# Patient Record
Sex: MALE | Race: WHITE | NOT HISPANIC OR LATINO | Employment: UNEMPLOYED | URBAN - NONMETROPOLITAN AREA
[De-identification: names, ages, dates, MRNs, and addresses within clinical notes are randomized per-mention and may not be internally consistent; named-entity substitution may affect disease eponyms.]

---

## 2019-09-16 ENCOUNTER — TELEPHONE (OUTPATIENT)
Dept: CARDIOLOGY | Facility: CLINIC | Age: 64
End: 2019-09-16

## 2019-09-16 NOTE — TELEPHONE ENCOUNTER
Patient confirmed appt with Dr Huang ([facility] referral) for Thursday the 19th . Will obtain records.

## 2019-09-19 ENCOUNTER — PREP FOR SURGERY (OUTPATIENT)
Dept: OTHER | Facility: HOSPITAL | Age: 64
End: 2019-09-19

## 2019-09-19 ENCOUNTER — OFFICE VISIT (OUTPATIENT)
Dept: CARDIOLOGY | Facility: CLINIC | Age: 64
End: 2019-09-19

## 2019-09-19 VITALS
HEIGHT: 67 IN | HEART RATE: 74 BPM | BODY MASS INDEX: 22.73 KG/M2 | SYSTOLIC BLOOD PRESSURE: 120 MMHG | WEIGHT: 144.8 LBS | DIASTOLIC BLOOD PRESSURE: 74 MMHG | OXYGEN SATURATION: 98 %

## 2019-09-19 DIAGNOSIS — E78.2 MIXED HYPERLIPIDEMIA: ICD-10-CM

## 2019-09-19 DIAGNOSIS — R07.2 PRECORDIAL PAIN: ICD-10-CM

## 2019-09-19 DIAGNOSIS — I10 ESSENTIAL HYPERTENSION: ICD-10-CM

## 2019-09-19 DIAGNOSIS — I25.5 ISCHEMIC CARDIOMYOPATHY: Primary | ICD-10-CM

## 2019-09-19 DIAGNOSIS — Z00.00 GENERAL MEDICAL EXAM: Primary | ICD-10-CM

## 2019-09-19 DIAGNOSIS — I25.5 ISCHEMIC CARDIOMYOPATHY: ICD-10-CM

## 2019-09-19 DIAGNOSIS — R94.39 ABNORMAL STRESS TEST: ICD-10-CM

## 2019-09-19 DIAGNOSIS — Z72.0 TOBACCO ABUSE: ICD-10-CM

## 2019-09-19 PROCEDURE — 99204 OFFICE O/P NEW MOD 45 MIN: CPT | Performed by: INTERNAL MEDICINE

## 2019-09-19 PROCEDURE — 93000 ELECTROCARDIOGRAM COMPLETE: CPT | Performed by: INTERNAL MEDICINE

## 2019-09-19 RX ORDER — SODIUM CHLORIDE 9 MG/ML
125 INJECTION, SOLUTION INTRAVENOUS CONTINUOUS
Status: CANCELLED | OUTPATIENT
Start: 2019-09-26

## 2019-09-19 RX ORDER — MONTELUKAST SODIUM 10 MG/1
10 TABLET ORAL DAILY
Refills: 1 | COMMUNITY
Start: 2019-08-21

## 2019-09-19 RX ORDER — BUDESONIDE AND FORMOTEROL FUMARATE DIHYDRATE 160; 4.5 UG/1; UG/1
AEROSOL RESPIRATORY (INHALATION)
COMMUNITY
End: 2022-09-01

## 2019-09-19 RX ORDER — LISINOPRIL 10 MG/1
10 TABLET ORAL DAILY
Refills: 1 | COMMUNITY
Start: 2019-08-21 | End: 2021-05-05

## 2019-09-19 RX ORDER — SODIUM CHLORIDE 0.9 % (FLUSH) 0.9 %
10 SYRINGE (ML) INJECTION AS NEEDED
Status: CANCELLED | OUTPATIENT
Start: 2019-09-26

## 2019-09-19 RX ORDER — ATORVASTATIN CALCIUM 40 MG/1
40 TABLET, FILM COATED ORAL DAILY
Refills: 5 | COMMUNITY
Start: 2019-08-12 | End: 2022-02-25

## 2019-09-19 RX ORDER — HYDROGEN PEROXIDE 2.65 ML/100ML
81 LIQUID ORAL; TOPICAL DAILY
Refills: 3 | COMMUNITY
Start: 2019-08-12

## 2019-09-19 RX ORDER — SODIUM CHLORIDE 0.9 % (FLUSH) 0.9 %
3 SYRINGE (ML) INJECTION EVERY 12 HOURS SCHEDULED
Status: CANCELLED | OUTPATIENT
Start: 2019-09-26

## 2019-09-19 NOTE — PROGRESS NOTES
Kwame Hendricks  64 y.o. male    09/19/2019  1. General medical exam    2. Precordial pain    3. Abnormal stress test    4. Essential hypertension    5. Ischemic cardiomyopathy    6. Mixed hyperlipidemia    7. Tobacco abuse        History of Present Illness  Mr. Hendricks is a 64-year-old male who was referred by  for consideration for cardiac catheterization.  He was apparently admitted to [facility] in August 2019 with epigastric and chest discomfort which radiated to the back.   Cardiac enzymes apparently were negative for myocardial injury and further work-up included a gallbladder ultrasound which showed small gallstones and adenomyomatosis.  Of a 4 mm echogenic structure in the right collecting system which could represent a nonobstructing renal stone/cortical calcification,. X-ray of the chest was unremarkable.  Low-dose lung CT showed chronic changes with no suspicious parenchymal nodules  EKG showed sinus rhythm with no ST-T wave changes diagnostic of ischemia.  There was poor R wave progression in the anteroseptal leads.  Echocardiogram performed showed ejection fraction of 60% and there was mild mitral and tricuspid regurgitation  An exercise Cardiolite stress test was performed and he walked for a workload of 6M ETS and achieved a heart rate of 140.  There was no chest pain or ventricular ectopics.  However the nuclear images showed an apical scar with no definite ongoing ischemia but the gated images showed an LV ejection fraction of 25% which was different from what was noted on the echocardiogram.  She was hence recommended cardiac catheterization.  Patient has multiple risk factors for coronary artery disease including long-standing tobacco abuse, hypertension, hyperlipidemia and positive family history for CAD.  He was not a very good historian.    EKG showed sinus rhythm with heart rate of 69 bpm, no ST-T wave changes diagnostic of ischemia.  Early  "repolarization.    SUBJECTIVE    Allergies   Allergen Reactions   • Propranolol Shortness Of Breath         Past Medical History:   Diagnosis Date   • COPD (chronic obstructive pulmonary disease) (CMS/Formerly [facility])    • Hyperlipidemia    • Hypertension          History reviewed. No pertinent surgical history.      History reviewed. No pertinent family history.      Social History     Socioeconomic History   • Marital status:      Spouse name: Not on file   • Number of children: Not on file   • Years of education: Not on file   • Highest education level: Not on file   Tobacco Use   • Smoking status: Current Every Day Smoker     Packs/day: 1.00   • Smokeless tobacco: Never Used   Substance and Sexual Activity   • Alcohol use: No     Frequency: Never   • Drug use: Defer   • Sexual activity: Defer         Current Outpatient Medications   Medication Sig Dispense Refill   • atorvastatin (LIPITOR) 40 MG tablet Take 40 mg by mouth Daily.  5   • budesonide-formoterol (SYMBICORT) 160-4.5 MCG/ACT inhaler Symbicort 160 mcg-4.5 mcg/actuation HFA aerosol inhaler   1-2 puffs, q 12 h for longtern control of COPD/ASTHMA. [not a rescue medication]     • EQ ASPIRIN ADULT LOW DOSE 81 MG EC tablet Take 81 mg by mouth Daily.  3   • Fluticasone Furoate-Vilanterol (BREO ELLIPTA) 100-25 MCG/INH inhaler Breo Ellipta 100 mcg-25 mcg/dose powder for inhalation   Inhale 1 puff every day by inhalation route.     • lisinopril (PRINIVIL,ZESTRIL) 10 MG tablet Take 10 mg by mouth Daily. FOR 30 DAYS  1   • montelukast (SINGULAIR) 10 MG tablet Take 10 mg by mouth Daily.  1     No current facility-administered medications for this visit.          OBJECTIVE    /74   Pulse 74   Ht 170.2 cm (67\")   Wt 65.7 kg (144 lb 12.8 oz)   SpO2 98%   BMI 22.68 kg/m²         Review of Systems     Constitutional:  Denies recent weight loss, weight gain, fever or chills     HENT:  Denies any hearing loss, epistaxis, hoarseness, or difficulty speaking.     Eyes: " Wears eyeglasses or contact lenses     Respiratory: Dyspnea with exertion,no cough, wheezing, or hemoptysis.     Cardiovascular: See HPI     Gastrointestinal:  Denies change in bowel habits, dyspepsia, ulcer disease, hematochezia, or melena.     Endocrine: Negative for cold intolerance, heat intolerance, polydipsia, polyphagia and polyuria.    Genitourinary: Negative.      Musculoskeletal: Denies any history of arthritic symptoms or back problems     Skin:  Denies any change in hair or nails, rashes, or skin lesions.     Allergic/Immunologic: Negative.  Negative for environmental allergies, food allergies and immunocompromised state.     Neurological:  Denies any history of recurrent headaches, strokes, TIA, or seizure disorder.     Hematological: Denies any food allergies, seasonal allergies, bleeding disorders, or lymphadenopathy.     Psychiatric/Behavioral: Denies any history of depression, substance abuse, or change in cognitive function.         Physical Exam     Constitutional: Cooperative, alert and oriented, in no acute distress.     HENT:   Head: Normocephalic, normal hair patterns, no masses or tenderness.  Ears, Nose, and Throat: No gross abnormalities. No pallor or cyanosis.   Eyes: EOMS intact, PERRL, conjunctivae and lids unremarkable. Fundoscopic exam and visual fields not performed.   Neck: No palpable masses or adenopathy, no thyromegaly, no JVD, carotid pulses are full and equal bilaterally and without  Bruits.     Cardiovascular: Regular rhythm, S1 and S2 normal, no S3 or S4.  No murmurs, gallops, or rubs detected.     Pulmonary/Chest: Chest: normal symmetry, normal respiratory excursion, no intercostal retraction, no use of accessory muscles.            Pulmonary: Normal breath sounds. No rales or ronchi.    Abdominal: Abdomen soft, bowel sounds normoactive, no masses, no hepatosplenomegaly, non-tender, no bruits.     Musculoskeletal: No deformities, clubbing, cyanosis, erythema, or edema  observed.     Neurological: No gross motor or sensory deficits noted, affect appropriate, oriented to time, person, place.     Skin: Warm and dry to the touch, no apparent skin lesions or masses noted.     Psychiatric: He has a normal mood and affect. His behavior is normal. Judgment and thought content normal.         Procedures      No results found for: WBC, HGB, HCT, MCV, PLT  No results found for: GLUCOSE, BUN, CREATININE, EGFRIFNONA, EGFRIFAFRI, BCR, CO2, CALCIUM, PROTENTOTREF, ALBUMIN, LABIL2, AST, ALT  No results found for: CHOL  No results found for: TRIG  No results found for: HDL  No components found for: LDLCALC  No results found for: LDL  No results found for: HDLLDLRATIO  No components found for: CHOLHDL  No results found for: HGBA1C  No results found for: TSH, W9ABLYP, W5BDULR, THYROIDAB        ASSESSMENT AND PLAN  Mr. Hendricks has multiple risk factors for coronary artery disease as described above and an abnormal stress test showing apical scar and LV dilatation with an ejection fraction of 25%.  I believe that definitive evaluation by cardiac catheterization would be appropriate.  However there is a disparity between the ejection fraction noted by echocardiogram.  The plan would be to schedule the procedure next week.  All the risks and benefits have been explained to him in detail and he will be ASA II and Mallampati II.  Aggressive risk factor modification and smoking cessation was stressed.  I have continued antihypertensive therapy with lisinopril, antiplatelet therapy with aspirin and lipid-lowering therapy with atorvastatin.  Further recommendations will follow.    Kwame was seen today for follow-up.    Diagnoses and all orders for this visit:    General medical exam  -     ECG 12 Lead    Precordial pain    Abnormal stress test    Essential hypertension    Ischemic cardiomyopathy    Mixed hyperlipidemia    Tobacco abuse        Patient's Body mass index is 22.68 kg/m². BMI is within normal  parameters. No follow-up required..      Kwame Hendricks is a current cigarettes user.  He currently smokes 2 pack of cigarettes per day for a duration of 45 years. I have educated him on the risk of diseases from using tobacco products such as cancer, COPD and heart diease.     I spent 3  minutes counseling the patient.          Alva Canas MD  9/19/2019  2:36 PM

## 2019-09-26 ENCOUNTER — HOSPITAL ENCOUNTER (OUTPATIENT)
Facility: HOSPITAL | Age: 64
Setting detail: HOSPITAL OUTPATIENT SURGERY
Discharge: HOME | End: 2019-09-26
Attending: INTERNAL MEDICINE | Admitting: INTERNAL MEDICINE

## 2019-09-26 VITALS
WEIGHT: 143.3 LBS | BODY MASS INDEX: 22.49 KG/M2 | RESPIRATION RATE: 18 BRPM | HEIGHT: 67 IN | DIASTOLIC BLOOD PRESSURE: 90 MMHG | OXYGEN SATURATION: 96 % | HEART RATE: 66 BPM | TEMPERATURE: 97 F | SYSTOLIC BLOOD PRESSURE: 187 MMHG

## 2019-09-26 DIAGNOSIS — I25.5 ISCHEMIC CARDIOMYOPATHY: ICD-10-CM

## 2019-09-26 LAB
ALBUMIN SERPL-MCNC: 4.2 G/DL (ref 3.5–5.2)
ALBUMIN/GLOB SERPL: 1.6 G/DL
ALP SERPL-CCNC: 98 U/L (ref 39–117)
ALT SERPL W P-5'-P-CCNC: 13 U/L (ref 1–41)
ANION GAP SERPL CALCULATED.3IONS-SCNC: 10 MMOL/L (ref 5–15)
AST SERPL-CCNC: 19 U/L (ref 1–40)
BILIRUB SERPL-MCNC: 0.2 MG/DL (ref 0.2–1.2)
BUN BLD-MCNC: 13 MG/DL (ref 8–23)
BUN/CREAT SERPL: 11.4 (ref 7–25)
CALCIUM SPEC-SCNC: 8.9 MG/DL (ref 8.6–10.5)
CHLORIDE SERPL-SCNC: 109 MMOL/L (ref 98–107)
CO2 SERPL-SCNC: 26 MMOL/L (ref 22–29)
CREAT BLD-MCNC: 1.14 MG/DL (ref 0.76–1.27)
DEPRECATED RDW RBC AUTO: 47.3 FL (ref 37–54)
ERYTHROCYTE [DISTWIDTH] IN BLOOD BY AUTOMATED COUNT: 13.8 % (ref 12.3–15.4)
GFR SERPL CREATININE-BSD FRML MDRD: 65 ML/MIN/1.73
GLOBULIN UR ELPH-MCNC: 2.7 GM/DL
GLUCOSE BLD-MCNC: 106 MG/DL (ref 65–99)
HCT VFR BLD AUTO: 40.3 % (ref 37.5–51)
HGB BLD-MCNC: 13.3 G/DL (ref 13–17.7)
INR PPP: 0.94 (ref 0.8–1.2)
MCH RBC QN AUTO: 30.6 PG (ref 26.6–33)
MCHC RBC AUTO-ENTMCNC: 33 G/DL (ref 31.5–35.7)
MCV RBC AUTO: 92.6 FL (ref 79–97)
PLATELET # BLD AUTO: 195 10*3/MM3 (ref 140–450)
PMV BLD AUTO: 10.1 FL (ref 6–12)
POTASSIUM BLD-SCNC: 4.1 MMOL/L (ref 3.5–5.2)
PROT SERPL-MCNC: 6.9 G/DL (ref 6–8.5)
PROTHROMBIN TIME: 12.4 SECONDS (ref 11.1–15.3)
RBC # BLD AUTO: 4.35 10*6/MM3 (ref 4.14–5.8)
SODIUM BLD-SCNC: 145 MMOL/L (ref 136–145)
WBC NRBC COR # BLD: 6.87 10*3/MM3 (ref 3.4–10.8)

## 2019-09-26 PROCEDURE — 25010000002 MIDAZOLAM PER 1 MG: Performed by: INTERNAL MEDICINE

## 2019-09-26 PROCEDURE — C1769 GUIDE WIRE: HCPCS | Performed by: INTERNAL MEDICINE

## 2019-09-26 PROCEDURE — C1760 CLOSURE DEV, VASC: HCPCS | Performed by: INTERNAL MEDICINE

## 2019-09-26 PROCEDURE — 0 IOPAMIDOL PER 1 ML: Performed by: INTERNAL MEDICINE

## 2019-09-26 PROCEDURE — 93458 L HRT ARTERY/VENTRICLE ANGIO: CPT | Performed by: INTERNAL MEDICINE

## 2019-09-26 PROCEDURE — C1894 INTRO/SHEATH, NON-LASER: HCPCS | Performed by: INTERNAL MEDICINE

## 2019-09-26 PROCEDURE — 25010000002 FENTANYL CITRATE (PF) 100 MCG/2ML SOLUTION: Performed by: INTERNAL MEDICINE

## 2019-09-26 PROCEDURE — 85610 PROTHROMBIN TIME: CPT | Performed by: INTERNAL MEDICINE

## 2019-09-26 PROCEDURE — 85027 COMPLETE CBC AUTOMATED: CPT | Performed by: INTERNAL MEDICINE

## 2019-09-26 PROCEDURE — 80053 COMPREHEN METABOLIC PANEL: CPT | Performed by: INTERNAL MEDICINE

## 2019-09-26 RX ORDER — ACETAMINOPHEN 325 MG/1
650 TABLET ORAL EVERY 4 HOURS PRN
Status: DISCONTINUED | OUTPATIENT
Start: 2019-09-26 | End: 2019-09-26 | Stop reason: HOSPADM

## 2019-09-26 RX ORDER — SODIUM CHLORIDE 9 MG/ML
125 INJECTION, SOLUTION INTRAVENOUS CONTINUOUS
Status: ACTIVE | OUTPATIENT
Start: 2019-09-26 | End: 2019-09-26

## 2019-09-26 RX ORDER — SODIUM CHLORIDE 9 MG/ML
125 INJECTION, SOLUTION INTRAVENOUS CONTINUOUS
Status: DISCONTINUED | OUTPATIENT
Start: 2019-09-26 | End: 2019-09-26 | Stop reason: SDUPTHER

## 2019-09-26 RX ORDER — MIDAZOLAM HYDROCHLORIDE 1 MG/ML
INJECTION INTRAMUSCULAR; INTRAVENOUS AS NEEDED
Status: DISCONTINUED | OUTPATIENT
Start: 2019-09-26 | End: 2019-09-26 | Stop reason: HOSPADM

## 2019-09-26 RX ORDER — SODIUM CHLORIDE 0.9 % (FLUSH) 0.9 %
10 SYRINGE (ML) INJECTION AS NEEDED
Status: DISCONTINUED | OUTPATIENT
Start: 2019-09-26 | End: 2019-09-26 | Stop reason: HOSPADM

## 2019-09-26 RX ORDER — FENTANYL CITRATE 50 UG/ML
INJECTION, SOLUTION INTRAMUSCULAR; INTRAVENOUS AS NEEDED
Status: DISCONTINUED | OUTPATIENT
Start: 2019-09-26 | End: 2019-09-26 | Stop reason: HOSPADM

## 2019-09-26 RX ORDER — ISOSORBIDE MONONITRATE 30 MG/1
30 TABLET, EXTENDED RELEASE ORAL EVERY MORNING
Qty: 30 TABLET | Refills: 6 | Status: SHIPPED | OUTPATIENT
Start: 2019-09-26 | End: 2021-05-05

## 2019-09-26 RX ORDER — SODIUM CHLORIDE 0.9 % (FLUSH) 0.9 %
3 SYRINGE (ML) INJECTION EVERY 12 HOURS SCHEDULED
Status: DISCONTINUED | OUTPATIENT
Start: 2019-09-26 | End: 2019-09-26 | Stop reason: HOSPADM

## 2019-09-26 RX ORDER — NITROGLYCERIN 0.4 MG/1
TABLET SUBLINGUAL AS NEEDED
Status: DISCONTINUED | OUTPATIENT
Start: 2019-09-26 | End: 2019-09-26 | Stop reason: HOSPADM

## 2019-09-26 RX ORDER — LIDOCAINE HYDROCHLORIDE 20 MG/ML
INJECTION, SOLUTION INFILTRATION; PERINEURAL AS NEEDED
Status: DISCONTINUED | OUTPATIENT
Start: 2019-09-26 | End: 2019-09-26 | Stop reason: HOSPADM

## 2019-09-26 RX ADMIN — SODIUM CHLORIDE 125 ML/HR: 9 INJECTION, SOLUTION INTRAVENOUS at 06:55

## 2021-05-05 ENCOUNTER — OFFICE VISIT (OUTPATIENT)
Dept: CARDIOLOGY | Facility: CLINIC | Age: 66
End: 2021-05-05

## 2021-05-05 VITALS
HEIGHT: 67 IN | SYSTOLIC BLOOD PRESSURE: 154 MMHG | BODY MASS INDEX: 22.79 KG/M2 | OXYGEN SATURATION: 98 % | HEART RATE: 73 BPM | DIASTOLIC BLOOD PRESSURE: 88 MMHG | WEIGHT: 145.2 LBS

## 2021-05-05 DIAGNOSIS — I25.5 ISCHEMIC CARDIOMYOPATHY: ICD-10-CM

## 2021-05-05 DIAGNOSIS — I10 ESSENTIAL HYPERTENSION: Primary | ICD-10-CM

## 2021-05-05 DIAGNOSIS — R07.2 PRECORDIAL PAIN: ICD-10-CM

## 2021-05-05 DIAGNOSIS — E78.2 MIXED HYPERLIPIDEMIA: ICD-10-CM

## 2021-05-05 DIAGNOSIS — Z72.0 TOBACCO ABUSE: ICD-10-CM

## 2021-05-05 DIAGNOSIS — R06.09 DYSPNEA ON EXERTION: ICD-10-CM

## 2021-05-05 LAB
QT INTERVAL: 382 MS
QTC INTERVAL: 420 MS

## 2021-05-05 PROCEDURE — 93000 ELECTROCARDIOGRAM COMPLETE: CPT | Performed by: INTERNAL MEDICINE

## 2021-05-05 PROCEDURE — 99215 OFFICE O/P EST HI 40 MIN: CPT | Performed by: INTERNAL MEDICINE

## 2021-05-05 RX ORDER — AMLODIPINE BESYLATE 10 MG/1
10 TABLET ORAL DAILY
COMMUNITY
Start: 2021-04-18 | End: 2022-02-25

## 2021-05-05 RX ORDER — CHLORTHALIDONE 25 MG/1
25 TABLET ORAL DAILY
COMMUNITY
Start: 2021-04-30 | End: 2022-02-25

## 2021-05-05 RX ORDER — ISOSORBIDE MONONITRATE 60 MG/1
60 TABLET, EXTENDED RELEASE ORAL EVERY MORNING
Qty: 90 TABLET | Refills: 3 | Status: SHIPPED | OUTPATIENT
Start: 2021-05-05

## 2021-05-05 RX ORDER — LISINOPRIL 40 MG/1
40 TABLET ORAL DAILY
COMMUNITY
Start: 2021-04-12

## 2021-05-05 NOTE — PROGRESS NOTES
Kwame Hendricks  66 y.o. male    1. Essential hypertension    2. Mixed hyperlipidemia    3. Ischemic cardiomyopathy    4. Precordial pain    5. Dyspnea on exertion    6. Tobacco abuse        History of Present Illness;  Mr. Kwame Hendricks is a 66-year-old male who is being seen by me after very long interval.  The patient was a very poor historian and most of the information was obtained from his wife and medical records from [facility].  It appears that he was evaluated there on 4/12/2021 and he did complain of occasional chest discomfort especially after he uses inhalers.  However his symptoms are unrelated to exertion or ambulation.  He does have longstanding NYHA class II dyspnea on exertion and has COPD.     I had seen him back in September 2019 when he was referred by .  Back then, he complained of chest pressure/epigastric discomfort with risk factors for coronary artery disease including tobacco abuse, hypertention, hyperlipidemia and positive family history for CAD. He had been diagnosed to have gallstones. Though echocardiogram performed showed an ejection fraction of 60% with no significant valve abnormalities, Exercise Cardiolite stress test showed an apical scar with no definite reversible ischemia but ejection fraction was reported at 25%.  Hence definitive evaluation by cardiac catheterization was recommended.       Cardiac catheterization at that time showed:  Left main coronary artery: This was a patent vessel with minor plaque and some degree of calcification and left main divided into a left anterior descending coronary artery and left circumflex coronary artery  Left anterior descending coronary artery: This was a large system with minor calcification and minor plaques in the proximal segment.  Mid LAD had minor luminal irregularities and distal LAD was a patent vessel which wrapped around the apex of the heart.  Diagonal 1 had a high origin and noncritical disease up  to 30 to 40% was noted.  This was a small caliber vessel.  Diagonal 2 was a small caliber vessel less than 1.8 mm in diameter with a focal area of eccentric 80 to 90% stenosis in the proximal/mid segment  Left Circumflex coronary artery: This was a medium sized vessel with luminal irregularities and eccentric 70 to 75% lesion in the proximal/mid segment of the vessel.  Distally there were 2 small caliber obtuse marginal branches which were patent  Right Coronary artery: This was a dominant vessel with an eccentric 40% lesion in the proximal segment.  There was a high bifurcation of the vessel into a PDA and PLV branch.  No flow-limiting lesions noted.     Left heart catheterization and left ventricular gram: LV systolic function was normal with an ejection fraction of 55 to 60% with no wall motion of normalities.  Aortic pressure was 150/90 and left ventricular pressure was 150/15 mmHg     Impression: Normal left ventricular systolic function with no wall motion abnormality's.  Estimated ejection fraction 55 to 60%.  Systemic hypertension  70 to 75% lesion noted in the proximal/mid left circumflex coronary artery which is a medium sized vessel.  Small caliber (<1.8 mm ) diagonal 2 vessel had an eccentric 80 to 90% stenosis  Noncritical disease noted in the left anterior descending coronary artery and right coronary artery.     Maximal medical management was recommended and the patient did undergo cholecystectomy without any cardiac issues.  The patient unfortunately has continued to smoke but has been very compliant with his medications.  It appears that his diuretics were adjusted by his nephrologist and antihypertensive medications were adjusted.  He is known to have CKD stage IIIa.  His lab work from 2/18/2021 were reviewed and electrolytes were in the normal range and BUN was 19 and creatinine was 1.33.  AST and ALT were within normal limits.  CBC showed a hemoglobin of 14.7 and hematocrit of 44.1    On review  of his records and noted that he has had a cerebrovascular event in February 2020.  In March 2021 he was seen at [facility] for chest pressure and lab work showed that cardiac enzymes were negative for myocardial injury.  Hemoglobin was 14.5 and hematocrit 42.4.  BUN was 39 and creatinine 1.45    EKG showed sinus rhythm with heart rate of 73 bpm.  Rightward axis.  No ST-T wave changes diagnostic of ischemia.    SUBJECTIVE    Allergies   Allergen Reactions   • Propranolol Shortness Of Breath         Past Medical History:   Diagnosis Date   • Arthritis    • Asthma    • COPD (chronic obstructive pulmonary disease) (CMS/HCC)    • Hyperlipidemia    • Hypertension          Past Surgical History:   Procedure Laterality Date   • CARDIAC CATHETERIZATION N/A 9/26/2019    Procedure: Left Heart Cath/PCI if indicated;  Surgeon: Alva Canas MD;  Location: [facility] INVASIVE LOCATION;  Service: Cardiovascular   • KNEE ARTHROSCOPY Right    • TONSILLECTOMY           History reviewed. No pertinent family history.      Social History     Socioeconomic History   • Marital status:      Spouse name: Not on file   • Number of children: Not on file   • Years of education: Not on file   • Highest education level: Not on file   Tobacco Use   • Smoking status: Current Every Day Smoker     Packs/day: 2.00   • Smokeless tobacco: Never Used   Substance and Sexual Activity   • Alcohol use: No   • Drug use: No   • Sexual activity: Defer         Current Outpatient Medications   Medication Sig Dispense Refill   • amLODIPine (NORVASC) 10 MG tablet Take 10 mg by mouth Daily. FOR 30 DAYS     • budesonide-formoterol (SYMBICORT) 160-4.5 MCG/ACT inhaler Symbicort 160 mcg-4.5 mcg/actuation HFA aerosol inhaler   1-2 puffs, q 12 h for longtern control of COPD/ASTHMA.  not a rescue medication      • chlorthalidone (HYGROTON) 25 MG tablet Take 25 mg by mouth Daily.     • EQ ASPIRIN ADULT LOW DOSE 81 MG EC tablet Take  "81 mg by mouth Daily.  3   • Fluticasone Furoate-Vilanterol (BREO ELLIPTA) 100-25 MCG/INH inhaler Breo Ellipta 100 mcg-25 mcg/dose powder for inhalation   Inhale 1 puff every day by inhalation route.     • Incruse Ellipta 62.5 MCG/INH aerosol powder  Inhale 1 puff Daily.     • lisinopril (PRINIVIL,ZESTRIL) 40 MG tablet Take 40 mg by mouth Daily. FOR 30 DAYS     • montelukast (SINGULAIR) 10 MG tablet Take 10 mg by mouth Daily.  1   • atorvastatin (LIPITOR) 40 MG tablet Take 40 mg by mouth Daily.  5   • isosorbide mononitrate (IMDUR) 60 MG 24 hr tablet Take 1 tablet by mouth Every Morning. 90 tablet 3     No current facility-administered medications for this visit.         OBJECTIVE    /88 (BP Location: Left arm, Patient Position: Sitting, Cuff Size: Adult)   Pulse 73   Ht 170.2 cm (67\")   Wt 65.9 kg (145 lb 3.2 oz)   SpO2 98%   BMI 22.74 kg/m²         Review of Systems : The following systems are reviewed and changes noted as indicated in the history of present illness.    Constitutional:  Denies recent weight loss, weight gain, fever or chills     HENT:  Denies any hearing loss, epistaxis, hoarseness, or difficulty speaking.     Eyes: Wears eyeglasses or contact lenses     Respiratory: Dyspnea with exertion,no cough, wheezing, or hemoptysis.     Cardiovascular: See HPI    Gastrointestinal:  Denies change in bowel habits, dyspepsia, ulcer disease, hematochezia, or melena.     Endocrine: Negative for cold intolerance, heat intolerance, polydipsia, polyphagia and polyuria.    Genitourinary: CKD      Musculoskeletal: Denies any history of arthritic symptoms or back problems     Neurological:  Denies any history of recurrent headaches, strokes, TIA, or seizure disorder.     Hematological: Denies any food allergies, seasonal allergies, bleeding disorders, or lymphadenopathy.     Psychiatric/Behavioral: Denies any history of depression, substance abuse, or change in cognitive function.         Physical Exam : " The following systems are reviewed and changes noted as indicated below    Constitutional: Cooperative, alert and oriented, in no acute distress.     HENT:   Head: Normocephalic, normal hair patterns, no masses or tenderness.  Ears, Nose, and Throat: No gross abnormalities. No pallor or cyanosis.   Eyes: EOMS intact, PERRL, conjunctivae and lids unremarkable. Fundoscopic exam and visual fields not performed.   Neck: No palpable masses or adenopathy, no thyromegaly, no JVD, carotid pulses are full and equal bilaterally and without  Bruits.     Cardiovascular: Regular rhythm, S1 and S2 normal, no S3 or S4.  No murmurs, gallops, or rubs detected.     Pulmonary/Chest: Chest: Increased AP diameter of the chest, no intercostal retraction, no use of accessory muscles.            Pulmonary: Normal breath sounds. No rales or ronchi.    Abdominal: Abdomen soft, bowel sounds normoactive, no masses, no hepatosplenomegaly, non-tender, no bruits.     Musculoskeletal: No deformities, clubbing, cyanosis, erythema, or edema observed.     Neurological: No gross motor or sensory deficits noted, affect appropriate, oriented to time, person, place.     Skin: Warm and dry to the touch, no apparent skin lesions or masses noted.     Psychiatric: He has a normal mood and affect. His behavior is normal. Judgment and thought content normal.         Procedures      Lab Results   Component Value Date    WBC 6.87 09/26/2019    HGB 13.3 09/26/2019    HCT 40.3 09/26/2019    MCV 92.6 09/26/2019     09/26/2019     Lab Results   Component Value Date    GLUCOSE 106 (H) 09/26/2019    BUN 13 09/26/2019    CREATININE 1.14 09/26/2019    EGFRIFNONA 65 09/26/2019    BCR 11.4 09/26/2019    CO2 26.0 09/26/2019    CALCIUM 8.9 09/26/2019    ALBUMIN 4.20 09/26/2019    AST 19 09/26/2019    ALT 13 09/26/2019     No results found for: CHOL  No results found for: TRIG  No results found for: HDL  No components found for: LDLCALC  No results found for: LDL  No  results found for: HDLLDLRATIO  No components found for: CHOLHDL  No results found for: HGBA1C  No results found for: TSH, L1HCVPM, H8ZIVOD, THYROIDAB        ASSESSMENT AND PLAN  Mr. Hendricks has multiple medical issues as discussed in detail in the history of present illness including COPD/emphysema, hyperlipidemia, hypertension, DJD, history of cerebrovascular event, tobacco abuse, moderate coronary artery disease which is medically manageable back in September 2021 and CKD stage III.    As mentioned above, the patient is a very poor historian and his symptoms appears to be related to using inhalers rather than exertion.  His EKG shows no acute ST-T wave changes.  No signs of congestive heart failure was noted.  After reviewing the situation I discussed different treatment options with him in detail and I believe that maximization of medical therapy at this time would be reasonable given his history of CKD.  I have increased the dose of isosorbide mononitrate to 60 mg daily and optimization of blood pressure was stressed.  Amlodipine, chlorthalidone, lisinopril have been continued.  Aggressive risk factor modification and Bacot cessation stressed.    An echocardiogram to assess left ventricular and valvular function has been arranged.  If symptoms persist in spite of maximal medical management, and invasive work-up could be considered.    About 45 minutes was spent in the assessment and evaluation of this patient.    Diagnoses and all orders for this visit:    1. Essential hypertension (Primary)  -     ECG 12 Lead  -     Adult Transthoracic Echo Complete w/ Color, Spectral and Contrast if Necessary Per Protocol; Future    2. Mixed hyperlipidemia  -     Adult Transthoracic Echo Complete w/ Color, Spectral and Contrast if Necessary Per Protocol; Future    3. Ischemic cardiomyopathy  -     Adult Transthoracic Echo Complete w/ Color, Spectral and Contrast if Necessary Per Protocol; Future    4. Precordial pain  -      Adult Transthoracic Echo Complete w/ Color, Spectral and Contrast if Necessary Per Protocol; Future    5. Dyspnea on exertion  -     Adult Transthoracic Echo Complete w/ Color, Spectral and Contrast if Necessary Per Protocol; Future    6. Tobacco abuse    Other orders  -     isosorbide mononitrate (IMDUR) 60 MG 24 hr tablet; Take 1 tablet by mouth Every Morning.  Dispense: 90 tablet; Refill: 3        Patient's Body mass index is 22.74 kg/m². BMI is within normal parameters. No follow-up required..      Kwame Hendricks is a current cigarettes user.  He currently smokes 2 pack of cigarettes per day for a duration of 45 years. I have educated him on the risk of diseases from using tobacco products such as cancer, COPD and heart diease.     I spent 3  minutes counseling the patient.          Alva Canas MD  5/5/2021  11:20 CDT

## 2021-05-28 ENCOUNTER — TELEPHONE (OUTPATIENT)
Dept: CARDIOLOGY | Facility: CLINIC | Age: 66
End: 2021-05-28

## 2021-08-25 ENCOUNTER — OFFICE VISIT (OUTPATIENT)
Dept: CARDIOLOGY | Facility: CLINIC | Age: 66
End: 2021-08-25

## 2021-08-25 VITALS
WEIGHT: 145.4 LBS | SYSTOLIC BLOOD PRESSURE: 128 MMHG | TEMPERATURE: 97.7 F | DIASTOLIC BLOOD PRESSURE: 70 MMHG | BODY MASS INDEX: 22.82 KG/M2 | HEIGHT: 67 IN | OXYGEN SATURATION: 96 % | HEART RATE: 86 BPM

## 2021-08-25 DIAGNOSIS — I10 ESSENTIAL HYPERTENSION: ICD-10-CM

## 2021-08-25 DIAGNOSIS — Z72.0 TOBACCO ABUSE: ICD-10-CM

## 2021-08-25 DIAGNOSIS — E78.2 MIXED HYPERLIPIDEMIA: ICD-10-CM

## 2021-08-25 DIAGNOSIS — I25.5 ISCHEMIC CARDIOMYOPATHY: Primary | ICD-10-CM

## 2021-08-25 PROCEDURE — 99214 OFFICE O/P EST MOD 30 MIN: CPT | Performed by: INTERNAL MEDICINE

## 2021-08-25 RX ORDER — ALBUTEROL SULFATE 2.5 MG/3ML
SOLUTION RESPIRATORY (INHALATION)
COMMUNITY
Start: 2021-08-10 | End: 2021-08-25

## 2021-08-25 RX ORDER — EZETIMIBE 10 MG/1
10 TABLET ORAL DAILY
COMMUNITY
Start: 2021-08-10 | End: 2022-02-25

## 2021-08-25 NOTE — PROGRESS NOTES
Kwame Hendricks  66 y.o. male    1. Ischemic cardiomyopathy    2. Mixed hyperlipidemia    3. Essential hypertension    4. Tobacco abuse        History of Present Illness;  Mr. Kwame Hendricks is a 66-year-old male with multiple medical issues including hypertension, hyperlipidemia, COPD, tobacco abuse, coronary artery disease.  The patient was a very poor historian.  The patient has done reasonably well since I last saw him and his chest discomfort is few and far between and he does have chronic NYHA class II dyspnea on exertion which has been attributed to COPD.  His echocardiogram in May 2021 showed:  · Estimated left ventricular EF = 63% Left ventricular ejection fraction appears to be 61 - 65%. Left ventricular systolic function is normal.  · Left ventricular diastolic function is consistent with (grade I) impaired relaxation.  · Estimated right ventricular systolic pressure from tricuspid regurgitation is normal (<35 mmHg).    I had seen him back in September 2019 when he was referred by .  Back then, he complained of chest pressure/epigastric discomfort with risk factors for coronary artery disease including tobacco abuse, hypertention, hyperlipidemia and positive family history for CAD. He had been diagnosed to have gallstones. Though echocardiogram performed showed an ejection fraction of 60% with no significant valve abnormalities, Exercise Cardiolite stress test showed an apical scar with no definite reversible ischemia but ejection fraction was reported at 25%.  Hence definitive evaluation by cardiac catheterization was recommended.       Cardiac catheterization at that time showed:  Left main coronary artery: This was a patent vessel with minor plaque and some degree of calcification and left main divided into a left anterior descending coronary artery and left circumflex coronary artery  Left anterior descending coronary artery: This was a large system with minor calcification and minor  plaques in the proximal segment.  Mid LAD had minor luminal irregularities and distal LAD was a patent vessel which wrapped around the apex of the heart.  Diagonal 1 had a high origin and noncritical disease up to 30 to 40% was noted.  This was a small caliber vessel.  Diagonal 2 was a small caliber vessel less than 1.8 mm in diameter with a focal area of eccentric 80 to 90% stenosis in the proximal/mid segment  Left Circumflex coronary artery: This was a medium sized vessel with luminal irregularities and eccentric 70 to 75% lesion in the proximal/mid segment of the vessel.  Distally there were 2 small caliber obtuse marginal branches which were patent  Right Coronary artery: This was a dominant vessel with an eccentric 40% lesion in the proximal segment.  There was a high bifurcation of the vessel into a PDA and PLV branch.  No flow-limiting lesions noted.     Left heart catheterization and left ventricular gram: LV systolic function was normal with an ejection fraction of 55 to 60% with no wall motion of normalities.  Aortic pressure was 150/90 and left ventricular pressure was 150/15 mmHg     Impression: Normal left ventricular systolic function with no wall motion abnormality's.  Estimated ejection fraction 55 to 60%.  Systemic hypertension  70 to 75% lesion noted in the proximal/mid left circumflex coronary artery which is a medium sized vessel.  Small caliber (<1.8 mm ) diagonal 2 vessel had an eccentric 80 to 90% stenosis  Noncritical disease noted in the left anterior descending coronary artery and right coronary artery.    Patient underwent successful cholecystectomy without any complications and no cardiac issues.    On review of his records and noted that he has had a cerebrovascular event in February 2020.  In March 2021 he was seen at [facility] for chest pressure and lab work showed that cardiac enzymes were negative for myocardial injury.  Hemoglobin was 14.5 and hematocrit 42.4.   BUN was 39 and creatinine 1.45        Allergies   Allergen Reactions   • Propranolol Shortness Of Breath         Past Medical History:   Diagnosis Date   • Arthritis    • Asthma    • COPD (chronic obstructive pulmonary disease) (CMS/HCC)    • Hyperlipidemia    • Hypertension          Past Surgical History:   Procedure Laterality Date   • CARDIAC CATHETERIZATION N/A 9/26/2019    Procedure: Left Heart Cath/PCI if indicated;  Surgeon: Alva Canas MD;  Location: [facility] INVASIVE LOCATION;  Service: Cardiovascular   • KNEE ARTHROSCOPY Right    • TONSILLECTOMY           History reviewed. No pertinent family history.      Social History     Socioeconomic History   • Marital status:      Spouse name: Not on file   • Number of children: Not on file   • Years of education: Not on file   • Highest education level: Not on file   Tobacco Use   • Smoking status: Current Every Day Smoker     Packs/day: 2.00   • Smokeless tobacco: Never Used   Substance and Sexual Activity   • Alcohol use: No   • Drug use: No   • Sexual activity: Defer         Current Outpatient Medications   Medication Sig Dispense Refill   • amLODIPine (NORVASC) 10 MG tablet Take 10 mg by mouth Daily. FOR 30 DAYS     • budesonide-formoterol (SYMBICORT) 160-4.5 MCG/ACT inhaler Symbicort 160 mcg-4.5 mcg/actuation HFA aerosol inhaler   1-2 puffs, q 12 h for longtern control of COPD/ASTHMA.  not a rescue medication      • EQ ASPIRIN ADULT LOW DOSE 81 MG EC tablet Take 81 mg by mouth Daily.  3   • ezetimibe (ZETIA) 10 MG tablet Take 10 mg by mouth Daily. FOR 30 DAYS     • ipratropium (ATROVENT) 0.02 % nebulizer solution INHALE 1 VIAL IN NEBULIZER EVERY 6 HOURS AS NEEDED     • isosorbide mononitrate (IMDUR) 60 MG 24 hr tablet Take 1 tablet by mouth Every Morning. 90 tablet 3   • montelukast (SINGULAIR) 10 MG tablet Take 10 mg by mouth Daily.  1   • Advair Diskus 250-50 MCG/DOSE DISKUS Inhale 2 (Two) Times a Day.     • albuterol (PROVENTIL)  "(2.5 MG/3ML) 0.083% nebulizer solution USE 1 VIAL IN NEBULIZER THREE TIMES DAILY AS NEEDED     • atorvastatin (LIPITOR) 40 MG tablet Take 40 mg by mouth Daily.  5   • chlorthalidone (HYGROTON) 25 MG tablet Take 25 mg by mouth Daily.     • Fluticasone Furoate-Vilanterol (BREO ELLIPTA) 100-25 MCG/INH inhaler Breo Ellipta 100 mcg-25 mcg/dose powder for inhalation   Inhale 1 puff every day by inhalation route.     • Incruse Ellipta 62.5 MCG/INH aerosol powder  Inhale 1 puff Daily.     • lisinopril (PRINIVIL,ZESTRIL) 40 MG tablet Take 40 mg by mouth Daily. FOR 30 DAYS     • ProAir  (90 Base) MCG/ACT inhaler Inhale 2 puffs Every 4 (Four) Hours As Needed.       No current facility-administered medications for this visit.         OBJECTIVE    /70 (BP Location: Left arm, Patient Position: Sitting, Cuff Size: Adult)   Pulse 86   Temp 97.7 °F (36.5 °C)   Ht 170.2 cm (67\")   Wt 66 kg (145 lb 6.4 oz)   SpO2 96%   BMI 22.77 kg/m²         Review of Systems : The following systems are reviewed and changes noted as indicated below    Constitutional:  Denies recent weight loss, weight gain, fever or chills     HENT:  Denies any hearing loss, epistaxis, hoarseness, or difficulty speaking.     Eyes: Wears eyeglasses or contact lenses     Respiratory: Dyspnea with exertion,no cough, wheezing, or hemoptysis.     Cardiovascular: Chest discomfort few and far between.  No palpitation.  NYHA class II dyspnea.    Gastrointestinal:  Denies change in bowel habits, dyspepsia, ulcer disease, hematochezia, or melena.     Endocrine: Negative for cold intolerance, heat intolerance, polydipsia, polyphagia and polyuria.    Genitourinary: CKD      Musculoskeletal: Denies any history of arthritic symptoms or back problems     Neurological:  Denies any history of recurrent headaches, strokes, TIA, or seizure disorder.     Hematological: Denies any food allergies, seasonal allergies, bleeding disorders, or lymphadenopathy. "     Psychiatric/Behavioral: Denies any history of depression, substance abuse, or change in cognitive function.         Physical Exam : The following systems are reviewed and changes noted as indicated below    Constitutional: Cooperative, alert and oriented, in no acute distress.     HENT:   Head: Normocephalic, normal hair patterns, no masses or tenderness.  Ears, Nose, and Throat: No gross abnormalities. No pallor or cyanosis.   Eyes: EOMS intact, PERRL, conjunctivae and lids unremarkable. Fundoscopic exam and visual fields not performed.   Neck: No palpable masses or adenopathy, no thyromegaly, no JVD, carotid pulses are full and equal bilaterally and without  Bruits.     Cardiovascular: Regular rhythm, S1 and S2 normal, no S3 or S4.  No murmurs, gallops, or rubs detected.     Pulmonary/Chest: Chest: Increased AP diameter of the chest, no intercostal retraction, no use of accessory muscles.            Pulmonary: Normal breath sounds. No rales or ronchi.    Abdominal: Abdomen soft, bowel sounds normoactive, no masses, no hepatosplenomegaly, non-tender, no bruits.     Musculoskeletal: No deformities, clubbing, cyanosis, erythema, or edema observed.     Neurological: No gross motor or sensory deficits noted, affect appropriate, oriented to time, person, place.     Psychiatric: He has a normal mood and affect. His behavior is normal. Judgment and thought content normal.         Procedures      Lab Results   Component Value Date    WBC 6.87 09/26/2019    HGB 13.3 09/26/2019    HCT 40.3 09/26/2019    MCV 92.6 09/26/2019     09/26/2019     Lab Results   Component Value Date    GLUCOSE 106 (H) 09/26/2019    BUN 13 09/26/2019    CREATININE 1.14 09/26/2019    EGFRIFNONA 65 09/26/2019    BCR 11.4 09/26/2019    CO2 26.0 09/26/2019    CALCIUM 8.9 09/26/2019    ALBUMIN 4.20 09/26/2019    AST 19 09/26/2019    ALT 13 09/26/2019     No results found for: CHOL  No results found for: TRIG  No results found for: HDL  No  components found for: LDLCALC  No results found for: LDL  No results found for: HDLLDLRATIO  No components found for: CHOLHDL  No results found for: HGBA1C  No results found for: TSH, T2BDCXM, P9OSGFB, THYROIDAB        ASSESSMENT AND PLAN  Mr. Hendricks has multiple medical issues as discussed in detail in the history of present illness including COPD/emphysema, hyperlipidemia, hypertension, DJD, history of cerebrovascular event, tobacco abuse, moderate coronary artery disease which is medically manageable back in September 2021 and CKD stage III.  After reviewing his medication, maximal medical management will be continued.  Antianginal therapy with isosorbide mononitrate, antihypertensive therapy with amlodipine, chlorthalidone, lisinopril have been continued.  Aggressive risk factor modification and tobacco cessation stressed.  He continues regular follow-up with his primary care physician and nephrology.  A copy of the blood test results performed recently by his primary care physician will be obtained and reviewed.    Diagnoses and all orders for this visit:    1. Ischemic cardiomyopathy (Primary)    2. Mixed hyperlipidemia    3. Essential hypertension    4. Tobacco abuse        Patient's Body mass index is 22.77 kg/m². BMI is within normal parameters. No follow-up required..      Kwame Hendricks is a current cigarettes user.  He currently smokes 2 pack of cigarettes per day for a duration of 45 years. I have educated him on the risk of diseases from using tobacco products such as cancer, COPD and heart diease.     I spent 3  minutes counseling the patient.          Alva Canas MD  8/25/2021  09:46 CDT

## 2022-02-25 ENCOUNTER — LAB (OUTPATIENT)
Dept: LAB | Facility: HOSPITAL | Age: 67
End: 2022-02-25

## 2022-02-25 ENCOUNTER — OFFICE VISIT (OUTPATIENT)
Dept: CARDIOLOGY | Facility: CLINIC | Age: 67
End: 2022-02-25

## 2022-02-25 VITALS
BODY MASS INDEX: 23.07 KG/M2 | TEMPERATURE: 97.1 F | OXYGEN SATURATION: 95 % | WEIGHT: 147 LBS | DIASTOLIC BLOOD PRESSURE: 76 MMHG | HEIGHT: 67 IN | SYSTOLIC BLOOD PRESSURE: 142 MMHG | HEART RATE: 74 BPM

## 2022-02-25 DIAGNOSIS — I10 ESSENTIAL HYPERTENSION: ICD-10-CM

## 2022-02-25 DIAGNOSIS — E78.2 MIXED HYPERLIPIDEMIA: ICD-10-CM

## 2022-02-25 DIAGNOSIS — I25.5 ISCHEMIC CARDIOMYOPATHY: ICD-10-CM

## 2022-02-25 DIAGNOSIS — I25.5 ISCHEMIC CARDIOMYOPATHY: Primary | ICD-10-CM

## 2022-02-25 LAB
ALBUMIN SERPL-MCNC: 4.6 G/DL (ref 3.5–5.2)
ALBUMIN/GLOB SERPL: 1.7 G/DL
ALP SERPL-CCNC: 133 U/L (ref 39–117)
ALT SERPL W P-5'-P-CCNC: 15 U/L (ref 1–41)
ANION GAP SERPL CALCULATED.3IONS-SCNC: 10.3 MMOL/L (ref 5–15)
AST SERPL-CCNC: 21 U/L (ref 1–40)
BASOPHILS # BLD AUTO: 0.06 10*3/MM3 (ref 0–0.2)
BASOPHILS NFR BLD AUTO: 0.9 % (ref 0–1.5)
BILIRUB SERPL-MCNC: 0.4 MG/DL (ref 0–1.2)
BUN SERPL-MCNC: 13 MG/DL (ref 8–23)
BUN/CREAT SERPL: 11 (ref 7–25)
CALCIUM SPEC-SCNC: 9.3 MG/DL (ref 8.6–10.5)
CHLORIDE SERPL-SCNC: 107 MMOL/L (ref 98–107)
CHOLEST SERPL-MCNC: 174 MG/DL (ref 0–200)
CO2 SERPL-SCNC: 24.7 MMOL/L (ref 22–29)
CREAT SERPL-MCNC: 1.18 MG/DL (ref 0.76–1.27)
DEPRECATED RDW RBC AUTO: 46.5 FL (ref 37–54)
EOSINOPHIL # BLD AUTO: 0.22 10*3/MM3 (ref 0–0.4)
EOSINOPHIL NFR BLD AUTO: 3.3 % (ref 0.3–6.2)
ERYTHROCYTE [DISTWIDTH] IN BLOOD BY AUTOMATED COUNT: 13.6 % (ref 12.3–15.4)
GFR SERPL CREATININE-BSD FRML MDRD: 62 ML/MIN/1.73
GLOBULIN UR ELPH-MCNC: 2.7 GM/DL
GLUCOSE SERPL-MCNC: 94 MG/DL (ref 65–99)
HCT VFR BLD AUTO: 44.3 % (ref 37.5–51)
HDLC SERPL-MCNC: 51 MG/DL (ref 40–60)
HGB BLD-MCNC: 15 G/DL (ref 13–17.7)
IMM GRANULOCYTES # BLD AUTO: 0.02 10*3/MM3 (ref 0–0.05)
IMM GRANULOCYTES NFR BLD AUTO: 0.3 % (ref 0–0.5)
LDLC SERPL CALC-MCNC: 112 MG/DL (ref 0–100)
LDLC/HDLC SERPL: 2.18 {RATIO}
LYMPHOCYTES # BLD AUTO: 2.52 10*3/MM3 (ref 0.7–3.1)
LYMPHOCYTES NFR BLD AUTO: 37.8 % (ref 19.6–45.3)
MCH RBC QN AUTO: 31.4 PG (ref 26.6–33)
MCHC RBC AUTO-ENTMCNC: 33.9 G/DL (ref 31.5–35.7)
MCV RBC AUTO: 92.7 FL (ref 79–97)
MONOCYTES # BLD AUTO: 0.55 10*3/MM3 (ref 0.1–0.9)
MONOCYTES NFR BLD AUTO: 8.2 % (ref 5–12)
NEUTROPHILS NFR BLD AUTO: 3.3 10*3/MM3 (ref 1.7–7)
NEUTROPHILS NFR BLD AUTO: 49.5 % (ref 42.7–76)
NRBC BLD AUTO-RTO: 0 /100 WBC (ref 0–0.2)
PLATELET # BLD AUTO: 220 10*3/MM3 (ref 140–450)
PMV BLD AUTO: 10.5 FL (ref 6–12)
POTASSIUM SERPL-SCNC: 4.7 MMOL/L (ref 3.5–5.2)
PROT SERPL-MCNC: 7.3 G/DL (ref 6–8.5)
RBC # BLD AUTO: 4.78 10*6/MM3 (ref 4.14–5.8)
SODIUM SERPL-SCNC: 142 MMOL/L (ref 136–145)
TRIGL SERPL-MCNC: 59 MG/DL (ref 0–150)
VLDLC SERPL-MCNC: 11 MG/DL (ref 5–40)
WBC NRBC COR # BLD: 6.67 10*3/MM3 (ref 3.4–10.8)

## 2022-02-25 PROCEDURE — 99214 OFFICE O/P EST MOD 30 MIN: CPT | Performed by: INTERNAL MEDICINE

## 2022-02-25 PROCEDURE — 36415 COLL VENOUS BLD VENIPUNCTURE: CPT | Performed by: INTERNAL MEDICINE

## 2022-02-25 PROCEDURE — 80061 LIPID PANEL: CPT

## 2022-02-25 PROCEDURE — 80053 COMPREHEN METABOLIC PANEL: CPT | Performed by: INTERNAL MEDICINE

## 2022-02-25 PROCEDURE — 85025 COMPLETE CBC W/AUTO DIFF WBC: CPT | Performed by: INTERNAL MEDICINE

## 2022-02-25 RX ORDER — SOD SULF/POT CHLORIDE/MAG SULF 1.479 G
TABLET ORAL SEE ADMIN INSTRUCTIONS
COMMUNITY
Start: 2022-01-06 | End: 2022-02-25

## 2022-02-25 RX ORDER — AZITHROMYCIN 250 MG/1
TABLET, FILM COATED ORAL
COMMUNITY
Start: 2021-12-16 | End: 2022-02-25

## 2022-02-25 RX ORDER — HYDROCHLOROTHIAZIDE 25 MG/1
TABLET ORAL
COMMUNITY
End: 2022-02-25

## 2022-02-25 NOTE — PROGRESS NOTES
Kwame Hendricks  66 y.o. male    1. Ischemic cardiomyopathy    2. Essential hypertension    3. Mixed hyperlipidemia        History of Present Illness;  Mr. Kwame Hendricks is a 66-year-old male with multiple medical issues including hypertension, hyperlipidemia, COPD, tobacco abuse, coronary artery disease.  The patient was a very poor historian.  The patient has done reasonably well since I last saw him and his chest discomfort is few and far between and he does have chronic NYHA class II dyspnea on exertion which has been attributed to COPD.  His echocardiogram in May 2021 showed:  · Estimated left ventricular EF = 63% Left ventricular ejection fraction appears to be 61 - 65%. Left ventricular systolic function is normal.  · Left ventricular diastolic function is consistent with (grade I) impaired relaxation.  · Estimated right ventricular systolic pressure from tricuspid regurgitation is normal (<35 mmHg).    I had seen him back in September 2019 when he was referred by .  Back then, he complained of chest pressure/epigastric discomfort with risk factors for coronary artery disease including tobacco abuse, hypertention, hyperlipidemia and positive family history for CAD. He had been diagnosed to have gallstones. Though echocardiogram performed showed an ejection fraction of 60% with no significant valve abnormalities, Exercise Cardiolite stress test showed an apical scar with no definite reversible ischemia but ejection fraction was reported at 25%.  Hence definitive evaluation by cardiac catheterization was recommended.       Cardiac catheterization at that time showed:  Left main coronary artery: This was a patent vessel with minor plaque and some degree of calcification and left main divided into a left anterior descending coronary artery and left circumflex coronary artery  Left anterior descending coronary artery: This was a large system with minor calcification and minor plaques in the proximal  segment.  Mid LAD had minor luminal irregularities and distal LAD was a patent vessel which wrapped around the apex of the heart.  Diagonal 1 had a high origin and noncritical disease up to 30 to 40% was noted.  This was a small caliber vessel.  Diagonal 2 was a small caliber vessel less than 1.8 mm in diameter with a focal area of eccentric 80 to 90% stenosis in the proximal/mid segment  Left Circumflex coronary artery: This was a medium sized vessel with luminal irregularities and eccentric 70 to 75% lesion in the proximal/mid segment of the vessel.  Distally there were 2 small caliber obtuse marginal branches which were patent  Right Coronary artery: This was a dominant vessel with an eccentric 40% lesion in the proximal segment.  There was a high bifurcation of the vessel into a PDA and PLV branch.  No flow-limiting lesions noted.     Left heart catheterization and left ventricular gram: LV systolic function was normal with an ejection fraction of 55 to 60% with no wall motion of normalities.  Impression: Normal left ventricular systolic function with no wall motion abnormality's.  Estimated ejection fraction 55 to 60%.  Systemic hypertension  70 to 75% lesion noted in the proximal/mid left circumflex coronary artery which is a medium sized vessel.  Small caliber (<1.8 mm ) diagonal 2 vessel had an eccentric 80 to 90% stenosis  Noncritical disease noted in the left anterior descending coronary artery and right coronary artery.    On review of his records and noted that he has had a cerebrovascular event in February 2020.     The patient denied any cardiac symptoms and has been able to perform his activities of daily living.  He has been compliant with his medications.  He indicated that he has not had any blood work in about a year and hence lab tests will be arranged.  He unfortunately continues to smoke and we had a discussion about smoking cessation.      Allergies   Allergen Reactions   • Propranolol  Shortness Of Breath         Past Medical History:   Diagnosis Date   • Arthritis    • Asthma    • COPD (chronic obstructive pulmonary disease) (HCC)    • Hyperlipidemia    • Hypertension          Past Surgical History:   Procedure Laterality Date   • CARDIAC CATHETERIZATION N/A 9/26/2019    Procedure: Left Heart Cath/PCI if indicated;  Surgeon: Alva Canas MD;  Location: [facility] INVASIVE LOCATION;  Service: Cardiovascular   • KNEE ARTHROSCOPY Right    • TONSILLECTOMY           History reviewed. No pertinent family history.      Social History     Socioeconomic History   • Marital status:    Tobacco Use   • Smoking status: Current Every Day Smoker     Packs/day: 2.00   • Smokeless tobacco: Never Used   Substance and Sexual Activity   • Alcohol use: No   • Drug use: No   • Sexual activity: Defer         Current Outpatient Medications   Medication Sig Dispense Refill   • budesonide-formoterol (SYMBICORT) 160-4.5 MCG/ACT inhaler Symbicort 160 mcg-4.5 mcg/actuation HFA aerosol inhaler   1-2 puffs, q 12 h for longtern control of COPD/ASTHMA.  not a rescue medication      • EQ ASPIRIN ADULT LOW DOSE 81 MG EC tablet Take 81 mg by mouth Daily.  3   • isosorbide mononitrate (IMDUR) 60 MG 24 hr tablet Take 1 tablet by mouth Every Morning. 90 tablet 3   • lisinopril (PRINIVIL,ZESTRIL) 40 MG tablet Take 40 mg by mouth Daily. FOR 30 DAYS     • montelukast (SINGULAIR) 10 MG tablet Take 10 mg by mouth Daily.  1   • ProAir  (90 Base) MCG/ACT inhaler INHALE 2 PUFFS BY MOUTH AS NEEDED     • amLODIPine (NORVASC) 10 MG tablet Take 10 mg by mouth Daily. FOR 30 DAYS     • atorvastatin (LIPITOR) 40 MG tablet Take 40 mg by mouth Daily.  5   • azithromycin (ZITHROMAX) 250 MG tablet TAKE 1 TABLET BY MOUTH ONCE DAILY START ON DAY 2 OF THERAPY     • chlorthalidone (HYGROTON) 25 MG tablet Take 25 mg by mouth Daily.     • ezetimibe (ZETIA) 10 MG tablet Take 10 mg by mouth Daily. FOR 30 DAYS     •  "hydroCHLOROthiazide (HYDRODIURIL) 25 MG tablet hydrochlorothiazide 25 mg tablet   Take 1 tablet every day by oral route for 30 days.     • ipratropium (ATROVENT) 0.02 % nebulizer solution INHALE 1 VIAL IN NEBULIZER EVERY 6 HOURS AS NEEDED     • Sutab 5731-413-386 MG tablet See Admin Instructions.       No current facility-administered medications for this visit.         OBJECTIVE    /76 (BP Location: Left arm, Patient Position: Sitting, Cuff Size: Adult)   Pulse 74   Temp 97.1 °F (36.2 °C)   Ht 170.2 cm (67\")   Wt 66.7 kg (147 lb)   SpO2 95%   BMI 23.02 kg/m²         Review of Systems : The following systems are reviewed and changes noted as indicated below    Constitutional:  Denies recent weight loss, weight gain, fever or chills     HENT:  Denies any hearing loss, epistaxis, hoarseness, or difficulty speaking.     Eyes: Wears eyeglasses or contact lenses     Respiratory: Dyspnea with exertion,no cough, wheezing, or hemoptysis.     Cardiovascular: Chest discomfort few and far between.  No palpitation.  NYHA class II dyspnea.    Gastrointestinal:  Denies change in bowel habits, dyspepsia, ulcer disease, hematochezia, or melena.     Endocrine: Negative for cold intolerance, heat intolerance, polydipsia, polyphagia and polyuria.    Genitourinary: CKD      Musculoskeletal: Denies any history of arthritic symptoms or back problems     Neurological:  Denies any history of recurrent headaches, strokes, TIA, or seizure disorder.     Hematological: Denies any food allergies, seasonal allergies, bleeding disorders, or lymphadenopathy.     Psychiatric/Behavioral: Denies any history of depression, substance abuse, or change in cognitive function.         Physical Exam : The following systems are reviewed and changes noted as indicated below    Constitutional: Cooperative, alert and oriented, in no acute distress.     HENT:   Head: Normocephalic, normal hair patterns, no masses or tenderness.  Ears, Nose, and " Throat: No gross abnormalities. No pallor or cyanosis.   Eyes: EOMS intact, PERRL, conjunctivae and lids unremarkable. Fundoscopic exam and visual fields not performed.   Neck: No palpable masses or adenopathy, no thyromegaly, no JVD, carotid pulses are full and equal bilaterally and without  Bruits.     Cardiovascular: Regular rhythm, S1 and S2 normal, no S3 or S4.  No murmurs, gallops, or rubs detected.     Pulmonary/Chest: Chest: Increased AP diameter of the chest, no intercostal retraction, no use of accessory muscles.            Pulmonary: Normal breath sounds. No rales or ronchi.    Abdominal: Abdomen soft, bowel sounds normoactive, no masses, no hepatosplenomegaly, non-tender, no bruits.     Musculoskeletal: No deformities, clubbing, cyanosis, erythema, or edema observed.     Neurological: No gross motor or sensory deficits noted, affect appropriate, oriented to time, person, place.     Psychiatric: He has a normal mood and affect. His behavior is normal. Judgment and thought content normal.         Procedures      Lab Results   Component Value Date    WBC 6.87 09/26/2019    HGB 13.3 09/26/2019    HCT 40.3 09/26/2019    MCV 92.6 09/26/2019     09/26/2019     Lab Results   Component Value Date    GLUCOSE 106 (H) 09/26/2019    BUN 13 09/26/2019    CREATININE 1.14 09/26/2019    EGFRIFNONA 65 09/26/2019    BCR 11.4 09/26/2019    CO2 26.0 09/26/2019    CALCIUM 8.9 09/26/2019    ALBUMIN 4.20 09/26/2019    AST 19 09/26/2019    ALT 13 09/26/2019     No results found for: CHOL  No results found for: TRIG  No results found for: HDL  No components found for: LDLCALC  No results found for: LDL  No results found for: HDLLDLRATIO  No components found for: CHOLHDL  No results found for: HGBA1C  No results found for: TSH, H3VUERY, A5TXDWR, THYROIDAB        ASSESSMENT AND PLAN  Mr. Hendricks has multiple medical issues including COPD/emphysema, hyperlipidemia, hypertension, DJD, history of cerebrovascular event, tobacco  abuse, moderate coronary artery disease which is medically manageable and CKD stage III.    He has been advised to maintain a high fluid intake and lab tests indicated below have been ordered.  Smoking cessation was once again stressed.  Antianginal therapy with isosorbide mononitrate, antihypertensive therapy with lisinopril and antiplatelet therapy with aspirin have been continued.  I doubt that he is not on any lipid-lowering therapy at the present time.  After reviewing the results of the tests ordered, I will consider starting him on statins.  Aggressive risk factor modification and tobacco cessation stressed.  He continues regular follow-up with his primary care physician and nephrology.    Diagnoses and all orders for this visit:    1. Ischemic cardiomyopathy (Primary)  -     Lipid Panel; Future  -     CBC & Differential  -     Comprehensive Metabolic Panel    2. Essential hypertension  -     Lipid Panel; Future  -     CBC & Differential  -     Comprehensive Metabolic Panel    3. Mixed hyperlipidemia  -     Lipid Panel; Future  -     CBC & Differential  -     Comprehensive Metabolic Panel        Patient's Body mass index is 23.02 kg/m². BMI is within normal parameters. No follow-up required..      Kwame Hendricks is a current cigarettes user.  He currently smokes 2 pack of cigarettes per day for a duration of 45 years. I have educated him on the risk of diseases from using tobacco products such as cancer, COPD and heart diease.     I spent 3  minutes counseling the patient.          Alva Canas MD  2/25/2022  15:39 CST

## 2022-02-28 ENCOUNTER — TELEPHONE (OUTPATIENT)
Dept: CARDIOLOGY | Facility: CLINIC | Age: 67
End: 2022-02-28

## 2022-02-28 NOTE — TELEPHONE ENCOUNTER
Lipids acceptable.  Watch diet   CBC, CMP within normal limits  Per Dr. Huang  Patient notified and understood

## 2022-09-01 ENCOUNTER — OFFICE VISIT (OUTPATIENT)
Dept: CARDIOLOGY | Facility: CLINIC | Age: 67
End: 2022-09-01

## 2022-09-01 VITALS
OXYGEN SATURATION: 96 % | HEART RATE: 65 BPM | SYSTOLIC BLOOD PRESSURE: 146 MMHG | DIASTOLIC BLOOD PRESSURE: 78 MMHG | HEIGHT: 67 IN | BODY MASS INDEX: 21.72 KG/M2 | TEMPERATURE: 98 F | WEIGHT: 138.4 LBS

## 2022-09-01 DIAGNOSIS — Z72.0 TOBACCO ABUSE: ICD-10-CM

## 2022-09-01 DIAGNOSIS — I10 ESSENTIAL HYPERTENSION: ICD-10-CM

## 2022-09-01 DIAGNOSIS — I25.5 ISCHEMIC CARDIOMYOPATHY: Primary | ICD-10-CM

## 2022-09-01 DIAGNOSIS — E78.2 MIXED HYPERLIPIDEMIA: ICD-10-CM

## 2022-09-01 PROCEDURE — 93000 ELECTROCARDIOGRAM COMPLETE: CPT | Performed by: INTERNAL MEDICINE

## 2022-09-01 PROCEDURE — 99214 OFFICE O/P EST MOD 30 MIN: CPT | Performed by: INTERNAL MEDICINE

## 2022-09-01 RX ORDER — CHLORTHALIDONE 25 MG/1
25 TABLET ORAL
COMMUNITY
Start: 2022-04-20 | End: 2023-04-21

## 2022-09-01 RX ORDER — ROSUVASTATIN CALCIUM 10 MG/1
10 TABLET, COATED ORAL DAILY
Qty: 90 TABLET | Refills: 3 | Status: SHIPPED | OUTPATIENT
Start: 2022-09-01

## 2022-09-01 NOTE — PROGRESS NOTES
Kwame Hendricks  67 y.o. male    1. Ischemic cardiomyopathy    2. Essential hypertension    3. Mixed hyperlipidemia    4. Tobacco abuse        History of Present Illness;  Mr. Kwame Hendricks is a 67-year-old male with multiple medical issues including hypertension, hyperlipidemia, COPD, tobacco abuse, coronary artery disease, gallstones in the past.    About a week prior the patient did have an episode of chest pain which lasted for about an hour.  He did not seek any medical help at that time.  His symptoms resolved on its own without any intervention and has not reoccurred.  He has been compliant with his medications.  Unfortunately continues to smoke.    EKG today showed sinus rhythm with heart rate of 65 bpm.  No ST-T wave changes suggestive of ischemia.    His echocardiogram in May 2021 showed:  · Estimated left ventricular EF = 63% Left ventricular ejection fraction appears to be 61 - 65%. Left ventricular systolic function is normal.  · Left ventricular diastolic function is consistent with (grade I) impaired relaxation.  · Estimated right ventricular systolic pressure from tricuspid regurgitation is normal (<35 mmHg).    The patient was evaluated in September 2019 for chest pain and cardiac catheterization performed at that time showed:  Left main coronary artery: This was a patent vessel with minor plaque and some degree of calcification and left main divided into a left anterior descending coronary artery and left circumflex coronary artery  Left anterior descending coronary artery: This was a large system with minor calcification and minor plaques in the proximal segment.  Mid LAD had minor luminal irregularities and distal LAD was a patent vessel which wrapped around the apex of the heart.  Diagonal 1 had a high origin and noncritical disease up to 30 to 40% was noted.  This was a small caliber vessel.  Diagonal 2 was a small caliber vessel less than 1.8 mm in diameter with a focal area of eccentric 80  to 90% stenosis in the proximal/mid segment  Left Circumflex coronary artery: This was a medium sized vessel with luminal irregularities and eccentric 70 to 75% lesion in the proximal/mid segment of the vessel.  Distally there were 2 small caliber obtuse marginal branches which were patent  Right Coronary artery: This was a dominant vessel with an eccentric 40% lesion in the proximal segment.  There was a high bifurcation of the vessel into a PDA and PLV branch.  No flow-limiting lesions noted.     Left heart catheterization and left ventricular gram: LV systolic function was normal with an ejection fraction of 55 to 60% with no wall motion of normalities.  Impression: Normal left ventricular systolic function with no wall motion abnormality's.  Estimated ejection fraction 55 to 60%.  Systemic hypertension  70 to 75% lesion noted in the proximal/mid left circumflex coronary artery which is a medium sized vessel.  Small caliber (<1.8 mm ) diagonal 2 vessel had an eccentric 80 to 90% stenosis  Noncritical disease noted in the left anterior descending coronary artery and right coronary artery.    On review of his records and noted that he has had a cerebrovascular event in February 2020.       Allergies   Allergen Reactions   • Propranolol Shortness Of Breath         Past Medical History:   Diagnosis Date   • Arthritis    • Asthma    • COPD (chronic obstructive pulmonary disease) (HCC)    • Hyperlipidemia    • Hypertension          Past Surgical History:   Procedure Laterality Date   • CARDIAC CATHETERIZATION N/A 9/26/2019    Procedure: Left Heart Cath/PCI if indicated;  Surgeon: Alva Canas MD;  Location: [facility] INVASIVE LOCATION;  Service: Cardiovascular   • KNEE ARTHROSCOPY Right    • TONSILLECTOMY           History reviewed. No pertinent family history.      Social History     Socioeconomic History   • Marital status:    Tobacco Use   • Smoking status: Current Every Day Smoker      "Packs/day: 2.00   • Smokeless tobacco: Never Used   Substance and Sexual Activity   • Alcohol use: No   • Drug use: No   • Sexual activity: Defer         Current Outpatient Medications   Medication Sig Dispense Refill   • chlorthalidone (HYGROTON) 25 MG tablet Take 25 mg by mouth.     • Cyanocobalamin 1000 MCG sublingual tablet cyanocobalamin (vit B-12) 1,000 mcg sublingual tablet   Place 1 tablet every day by sublingual route.     • EQ ASPIRIN ADULT LOW DOSE 81 MG EC tablet Take 81 mg by mouth Daily.  3   • isosorbide mononitrate (IMDUR) 60 MG 24 hr tablet Take 1 tablet by mouth Every Morning. 90 tablet 3   • ProAir  (90 Base) MCG/ACT inhaler INHALE 2 PUFFS BY MOUTH AS NEEDED     • Trelegy Ellipta 100-62.5-25 MCG/INH inhaler      • lisinopril (PRINIVIL,ZESTRIL) 40 MG tablet Take 40 mg by mouth Daily. FOR 30 DAYS     • montelukast (SINGULAIR) 10 MG tablet Take 10 mg by mouth Daily.  1   • rosuvastatin (CRESTOR) 10 MG tablet Take 1 tablet by mouth Daily. 90 tablet 3     No current facility-administered medications for this visit.         OBJECTIVE    /78 (BP Location: Left arm, Patient Position: Sitting, Cuff Size: Adult)   Pulse 83   Temp 98 °F (36.7 °C)   Ht 170.2 cm (67\")   Wt 62.8 kg (138 lb 6.4 oz)   SpO2 96%   BMI 21.68 kg/m²         Review of Systems : The following systems are reviewed and changes noted as indicated below    Constitutional:  Denies recent weight loss, weight gain, fever or chills     HENT:  Denies any hearing loss, epistaxis, hoarseness, or difficulty speaking.     Eyes: Wears eyeglasses or contact lenses     Respiratory: Dyspnea with exertion,no cough, wheezing, or hemoptysis.     Cardiovascular: See HPI    Gastrointestinal:  Denies change in bowel habits, dyspepsia, ulcer disease, hematochezia, or melena.     Endocrine: Negative for cold intolerance, heat intolerance, polydipsia, polyphagia and polyuria.    Genitourinary: CKD      Musculoskeletal: Denies any history of " arthritic symptoms or back problems     Neurological:  Denies any history of recurrent headaches, strokes, TIA, or seizure disorder.     Hematological: Denies any food allergies, seasonal allergies, bleeding disorders, or lymphadenopathy.     Psychiatric/Behavioral: Denies any history of depression, substance abuse, or change in cognitive function.         Physical Exam : The following systems are reviewed and changes noted as indicated below    Constitutional: Cooperative, alert and oriented, in no acute distress.     HENT:   Head: Normocephalic, normal hair patterns, no masses or tenderness.  Ears, Nose, and Throat: No gross abnormalities. No pallor or cyanosis.   Eyes: EOMS intact, PERRL, conjunctivae and lids unremarkable. Fundoscopic exam and visual fields not performed.   Neck: No palpable masses or adenopathy, no thyromegaly, no JVD, carotid pulses are full and equal bilaterally and without  Bruits.     Cardiovascular: Regular rhythm, S1 and S2 normal, no S3 or S4.  No murmurs, gallops, or rubs detected.     Pulmonary/Chest: Chest: Increased AP diameter of the chest, no intercostal retraction, no use of accessory muscles.            Pulmonary: Normal breath sounds. No rales or ronchi.    Abdominal: Abdomen soft, bowel sounds normoactive, no masses, no hepatosplenomegaly, non-tender, no bruits.     Musculoskeletal: No deformities, clubbing, cyanosis, erythema, or edema observed.     Neurological: No gross motor or sensory deficits noted, affect appropriate, oriented to time, person, place.     Psychiatric: He has a normal mood and affect. His behavior is normal. Judgment and thought content normal.         Procedures      Lab Results   Component Value Date    WBC 6.67 02/25/2022    HGB 15.0 02/25/2022    HCT 44.3 02/25/2022    MCV 92.7 02/25/2022     02/25/2022     Lab Results   Component Value Date    GLUCOSE 94 02/25/2022    BUN 13 02/25/2022    CREATININE 1.18 02/25/2022    EGFRIFNONA 62 02/25/2022     BCR 11.0 02/25/2022    CO2 24.7 02/25/2022    CALCIUM 9.3 02/25/2022    ALBUMIN 4.60 02/25/2022    AST 21 02/25/2022    ALT 15 02/25/2022     Lab Results   Component Value Date    CHOL 174 02/25/2022     Lab Results   Component Value Date    TRIG 59 02/25/2022     Lab Results   Component Value Date    HDL 51 02/25/2022     No components found for: LDLCALC  Lab Results   Component Value Date     (H) 02/25/2022     No results found for: HDLLDLRATIO  No components found for: CHOLHDL  No results found for: HGBA1C  No results found for: TSH, O9JRWXI, A2MQJSU, THYROIDAB        ASSESSMENT AND PLAN  Mr. Hendricks has multiple medical issues including COPD/emphysema, hyperlipidemia, hypertension, DJD, history of cerebrovascular event, tobacco abuse, moderate coronary artery disease, CKD stage III.  The patient did have an episode of chest pain about a week prior which could represent angina.  His EKG today was normal.  I have continued antianginal therapy with isosorbide mononitrate, antihypertensive therapy with lisinopril and antiplatelet therapy with aspirin have been continued.   I have started him on Crestor 10 mg daily.  His LDL was 112 earlier this year.    Aggressive risk factor modification and smoking cessation once again stressed.  He has been advised to seek medical help/go to the emergency room in case he has any further episodes of chest pain, at which time further work-up will be considered.    Diagnoses and all orders for this visit:    1. Ischemic cardiomyopathy (Primary)  -     ECG 12 Lead    2. Essential hypertension    3. Mixed hyperlipidemia    4. Tobacco abuse    Other orders  -     rosuvastatin (CRESTOR) 10 MG tablet; Take 1 tablet by mouth Daily.  Dispense: 90 tablet; Refill: 3        Patient's Body mass index is 21.68 kg/m². BMI is within normal parameters. No follow-up required..      Kwame Hendricks is a current cigarettes user.  He currently smokes 2 pack of cigarettes per day for a  duration of 45 years. I have educated him on the risk of diseases from using tobacco products such as cancer, COPD and heart diease.     I spent 3  minutes counseling the patient.          Alva Canas MD  9/1/2022  11:47 CDT

## 2022-09-07 LAB
QT INTERVAL: 396 MS
QTC INTERVAL: 411 MS

## 2023-03-02 ENCOUNTER — OFFICE VISIT (OUTPATIENT)
Dept: CARDIOLOGY | Facility: CLINIC | Age: 68
End: 2023-03-02
Payer: MEDICARE

## 2023-03-02 VITALS
DIASTOLIC BLOOD PRESSURE: 80 MMHG | OXYGEN SATURATION: 96 % | BODY MASS INDEX: 22.6 KG/M2 | SYSTOLIC BLOOD PRESSURE: 142 MMHG | HEART RATE: 81 BPM | TEMPERATURE: 97.1 F | WEIGHT: 144 LBS | HEIGHT: 67 IN

## 2023-03-02 DIAGNOSIS — Z72.0 TOBACCO ABUSE: ICD-10-CM

## 2023-03-02 DIAGNOSIS — I10 ESSENTIAL HYPERTENSION: ICD-10-CM

## 2023-03-02 DIAGNOSIS — E78.2 MIXED HYPERLIPIDEMIA: ICD-10-CM

## 2023-03-02 DIAGNOSIS — I25.5 ISCHEMIC CARDIOMYOPATHY: Primary | ICD-10-CM

## 2023-03-02 PROCEDURE — 99214 OFFICE O/P EST MOD 30 MIN: CPT | Performed by: INTERNAL MEDICINE

## 2023-03-02 NOTE — PROGRESS NOTES
Kwame Hendricks  68 y.o. male    1. Ischemic cardiomyopathy    2. Essential hypertension    3. Mixed hyperlipidemia    4. Tobacco abuse        History of Present Illness;  Mr. Kwame Hendricks is a 68-year-old male with multiple medical issues including hypertension, hyperlipidemia, COPD, tobacco abuse, coronary artery disease, gallstones in the past.      His echocardiogram in May 2021 showed:  · Estimated left ventricular EF = 63% Left ventricular ejection fraction appears to be 61 - 65%. Left ventricular systolic function is normal.  · Left ventricular diastolic function is consistent with (grade I) impaired relaxation.  · Estimated right ventricular systolic pressure from tricuspid regurgitation is normal (<35 mmHg).    The patient was evaluated in September 2019 for chest pain and cardiac catheterization performed at that time showed:  Left main coronary artery: This was a patent vessel with minor plaque and some degree of calcification and left main divided into a left anterior descending coronary artery and left circumflex coronary artery  Left anterior descending coronary artery: This was a large system with minor calcification and minor plaques in the proximal segment.  Mid LAD had minor luminal irregularities and distal LAD was a patent vessel which wrapped around the apex of the heart.  Diagonal 1 had a high origin and noncritical disease up to 30 to 40% was noted.  This was a small caliber vessel.  Diagonal 2 was a small caliber vessel less than 1.8 mm in diameter with a focal area of eccentric 80 to 90% stenosis in the proximal/mid segment  Left Circumflex coronary artery: This was a medium sized vessel with luminal irregularities and eccentric 70 to 75% lesion in the proximal/mid segment of the vessel.  Distally there were 2 small caliber obtuse marginal branches which were patent  Right Coronary artery: This was a dominant vessel with an eccentric 40% lesion in the proximal segment.  There was a high  bifurcation of the vessel into a PDA and PLV branch.  No flow-limiting lesions noted.     Left heart catheterization and left ventricular gram: LV systolic function was normal with an ejection fraction of 55 to 60% with no wall motion of normalities.  Impression: Normal left ventricular systolic function with no wall motion abnormality's.  Estimated ejection fraction 55 to 60%.  Systemic hypertension  70 to 75% lesion noted in the proximal/mid left circumflex coronary artery which is a medium sized vessel.  Small caliber (<1.8 mm ) diagonal 2 vessel had an eccentric 80 to 90% stenosis  Noncritical disease noted in the left anterior descending coronary artery and right coronary artery.    On review of his records and noted that he has had a cerebrovascular event in February 2020.  He denied any cardiac symptoms at this time with no chest pain or palpitation.  He is able to perform his activities of daily living.  Unfortunately, he did not bring his medicines and we cannot confirm the list.  We will call his pharmacy to get the list of medicines.      Allergies   Allergen Reactions   • Propranolol Shortness Of Breath         Past Medical History:   Diagnosis Date   • Arthritis    • Asthma    • COPD (chronic obstructive pulmonary disease) (HCC)    • Hyperlipidemia    • Hypertension          Past Surgical History:   Procedure Laterality Date   • CARDIAC CATHETERIZATION N/A 9/26/2019    Procedure: Left Heart Cath/PCI if indicated;  Surgeon: Alva Canas MD;  Location: [facility] INVASIVE LOCATION;  Service: Cardiovascular   • KNEE ARTHROSCOPY Right    • TONSILLECTOMY           History reviewed. No pertinent family history.      Social History     Socioeconomic History   • Marital status:    Tobacco Use   • Smoking status: Every Day     Packs/day: 2.00     Types: Cigarettes   • Smokeless tobacco: Never   Substance and Sexual Activity   • Alcohol use: No   • Drug use: No   • Sexual activity: Defer  "        Current Outpatient Medications   Medication Sig Dispense Refill   • chlorthalidone (HYGROTON) 25 MG tablet Take 25 mg by mouth.     • Cyanocobalamin 1000 MCG sublingual tablet cyanocobalamin (vit B-12) 1,000 mcg sublingual tablet   Place 1 tablet every day by sublingual route.     • Cyanocobalamin 1000 MCG sublingual tablet Daily.     • EQ ASPIRIN ADULT LOW DOSE 81 MG EC tablet Take 81 mg by mouth Daily.  3   • isosorbide mononitrate (IMDUR) 60 MG 24 hr tablet Take 1 tablet by mouth Every Morning. 90 tablet 3   • lisinopril (PRINIVIL,ZESTRIL) 40 MG tablet Take 40 mg by mouth Daily. FOR 30 DAYS     • montelukast (SINGULAIR) 10 MG tablet Take 10 mg by mouth Daily.  1   • ProAir  (90 Base) MCG/ACT inhaler INHALE 2 PUFFS BY MOUTH AS NEEDED     • rosuvastatin (CRESTOR) 10 MG tablet Take 1 tablet by mouth Daily. 90 tablet 3   • Trelegy Ellipta 100-62.5-25 MCG/INH inhaler        No current facility-administered medications for this visit.         OBJECTIVE    /80 (BP Location: Left arm, Patient Position: Sitting, Cuff Size: Adult)   Pulse 81   Temp 97.1 °F (36.2 °C)   Ht 170.2 cm (67\")   Wt 65.3 kg (144 lb)   SpO2 96%   BMI 22.55 kg/m²         Review of Systems : The following systems are reviewed and changes noted as indicated below    Constitutional:  Denies recent weight loss, weight gain, fever or chills     HENT:  Denies any hearing loss, epistaxis, hoarseness, or difficulty speaking.     Eyes: Wears eyeglasses or contact lenses     Respiratory: dyspnea with exertion, no cough, wheezing, or hemoptysis.     Cardiovascular: No chest pain or palpitation.    Gastrointestinal:  Denies change in bowel habits, dyspepsia, ulcer disease, hematochezia, or melena.     Endocrine: Negative for cold intolerance, heat intolerance, polydipsia, polyphagia and polyuria.    Genitourinary: CKD      Musculoskeletal: Denies any history of arthritic symptoms or back problems     Neurological:  Denies any history " of recurrent headaches, strokes, TIA, or seizure disorder.     Hematological: Denies any food allergies, seasonal allergies, bleeding disorders, or lymphadenopathy.     Psychiatric/Behavioral: Denies any history of depression, substance abuse, or change in cognitive function.         Physical Exam : The following systems are reviewed and changes noted as indicated below    Constitutional: Cooperative, alert and oriented, in no acute distress.     HENT:   Head: Normocephalic, normal hair patterns, no masses or tenderness.  Ears, Nose, and Throat: No gross abnormalities. No pallor or cyanosis.   Eyes: EOMS intact, PERRL, conjunctivae and lids unremarkable. Fundoscopic exam and visual fields not performed.   Neck: No palpable masses or adenopathy, no thyromegaly, no JVD, carotid pulses are full and equal bilaterally and without  Bruits.     Cardiovascular: Regular rhythm, S1 and S2 normal, no S3 or S4.  No murmurs, gallops, or rubs detected.     Pulmonary/Chest: Chest: Increased AP diameter of the chest, no intercostal retraction, no use of accessory muscles.            Pulmonary: Normal breath sounds. No rales or ronchi.    Abdominal: Abdomen soft, bowel sounds normoactive, no masses, no hepatosplenomegaly, non-tender, no bruits.     Musculoskeletal: No deformities, clubbing, cyanosis, erythema, or edema observed.     Neurological: No gross motor or sensory deficits noted, affect appropriate, oriented to time, person, place.     Psychiatric: He has a normal mood and affect. His behavior is normal. Judgment and thought content normal.         Procedures      Lab Results   Component Value Date    WBC 6.67 02/25/2022    HGB 15.0 02/25/2022    HCT 44.3 02/25/2022    MCV 92.7 02/25/2022     02/25/2022     Lab Results   Component Value Date    GLUCOSE 94 02/25/2022    BUN 13 02/25/2022    CREATININE 1.18 02/25/2022    EGFRIFNONA 62 02/25/2022    BCR 11.0 02/25/2022    CO2 24.7 02/25/2022    CALCIUM 9.3 02/25/2022     ALBUMIN 4.60 02/25/2022    AST 21 02/25/2022    ALT 15 02/25/2022     Lab Results   Component Value Date    CHOL 174 02/25/2022     Lab Results   Component Value Date    TRIG 59 02/25/2022     Lab Results   Component Value Date    HDL 51 02/25/2022     No components found for: LDLCALC  Lab Results   Component Value Date     (H) 02/25/2022     No results found for: HDLLDLRATIO  No components found for: CHOLHDL  No results found for: HGBA1C  No results found for: TSH, [id number], J3LPIKQ, THYROIDAB        ASSESSMENT AND PLAN  Mr. Hendricks has multiple medical issues including COPD/emphysema, hyperlipidemia, hypertension, DJD, history of cerebrovascular event, tobacco abuse, moderate coronary artery disease, CKD stage III.    Unfortunately, the patient did not bring his medications and we were not able to confirm the list.  We will get a copy of the medication list from the pharmacy.  He has apparently had blood work done recently by his primary care physician.  A copy of the results will be obtained for our records.    Aggressive risk factor modification and smoking cessation once again stressed.  I have not made any changes in medicines at this time.    Diagnoses and all orders for this visit:    1. Ischemic cardiomyopathy (Primary)    2. Essential hypertension    3. Mixed hyperlipidemia    4. Tobacco abuse        Patient's Body mass index is 22.55 kg/m². BMI is within normal parameters. No follow-up required..      Kwame Hendricks is a current cigarettes user.  He currently smokes 2 pack of cigarettes per day for a duration of 45 years. I have educated him on the risk of diseases from using tobacco products such as cancer, COPD and heart diease.     I spent 3  minutes counseling the patient.          Alva Canas MD  3/2/2023  09:56 CST

## 2023-08-28 RX ORDER — ROSUVASTATIN CALCIUM 10 MG/1
TABLET, COATED ORAL
Qty: 90 TABLET | Refills: 0 | Status: SHIPPED | OUTPATIENT
Start: 2023-08-28

## 2023-09-14 ENCOUNTER — TRANSCRIBE ORDERS (OUTPATIENT)
Dept: LAB | Facility: HOSPITAL | Age: 68
End: 2023-09-14
Payer: MEDICARE

## 2023-09-14 DIAGNOSIS — N18.30 STAGE 3 CHRONIC KIDNEY DISEASE, UNSPECIFIED WHETHER STAGE 3A OR 3B CKD: Primary | ICD-10-CM

## (undated) DEVICE — CATH DIAG EXPO M/ PK 6FR FL4/FR4 PIG 3PK

## (undated) DEVICE — GW PERIPH GUIDERIGHT STD/J/TP PTFE/PCOAT SS 0.038IN 5X150CM

## (undated) DEVICE — A2000 MULTI-USE SYRINGE KIT, P/N 701277-003KIT CONTENTS: 100ML CONTRAST RESERVOIR AND TUBING WITH CONTRAST SPIKE AND CLAMP: Brand: A2000 MULTI-USE SYRINGE KIT

## (undated) DEVICE — PERCLOSE PROGLIDE™ SUTURE-MEDIATED CLOSURE SYSTEM: Brand: PERCLOSE PROGLIDE™

## (undated) DEVICE — ARTERIAL NEEDLE: Brand: UNBRANDED

## (undated) DEVICE — INTRO SHEATH ART/FEM ENGAGE .038 6F12CM

## (undated) DEVICE — MODEL BT2000 P/N 700287-012KIT CONTENTS: MANIFOLD WITH SALINE AND CONTRAST PORTS, SALINE TUBING WITH SPIKE AND HAND SYRINGE, TRANSDUCER: Brand: BT2000 AUTOMATED MANIFOLD KIT

## (undated) DEVICE — ELECTRODE,RT,STRESS,FOAM,50PK: Brand: MEDLINE

## (undated) DEVICE — PK CATH LAB 60